# Patient Record
Sex: FEMALE | Race: WHITE
[De-identification: names, ages, dates, MRNs, and addresses within clinical notes are randomized per-mention and may not be internally consistent; named-entity substitution may affect disease eponyms.]

---

## 2018-05-16 ENCOUNTER — HOSPITAL ENCOUNTER (OUTPATIENT)
Dept: HOSPITAL 47 - RADMRIMAIN | Age: 38
End: 2018-05-16
Payer: COMMERCIAL

## 2018-05-16 DIAGNOSIS — M47.817: Primary | ICD-10-CM

## 2018-05-16 PROCEDURE — 72158 MRI LUMBAR SPINE W/O & W/DYE: CPT

## 2018-05-17 NOTE — MR
EXAMINATION TYPE: MR lumbar spine wo/w con

 

DATE OF EXAM: 5/16/2018

 

COMPARISON: NONE

 

HISTORY: LBP for 20 years going into both lower extremities per patient, surgery Dec 2017 per patient
. Intervertebral disc degeneration and back pain per order.

 

TECHNIQUE: 

Multiplanar, multisequence images of the lumbar spine is performed without and with IV contrast, util
izing 7 mL intravenous Gadavist 

 

FINDINGS: Sagittal images of the lumbar spine show vertebral body heights and alignment to appear sat
isfactory. There is disc desiccation with mild disc space narrowing L5-S1 level otherwise the interve
rtebral discs demonstrate normal heights and hydration. Small posterior disc herniation with annular 
tear L5-S1 level is noted sagittal image 9. The conus medullaris is normal in position and signal end
ing superior L1 level.  The bone marrow signal intensity is within normal limits. No suspicious enhan
cement is seen. No significant spurring is noted.

 

Axial images show the T12-L1, L1-L2, L2-L3, and L3-L4 levels all to appear within normal limits.

 

Axial images at the L4-L5 level show mild left greater than right facet degenerative changes. Spinal 
canal is preserved. Bilateral neural foramina are patent.

 

Axial images at L5-S1 level show broad-based right paracentral/foraminal disc protrusion minimally ef
facing anterolateral thecal sac. Bilateral neural foramina are patent. There is mild facet arthropath
y bilaterally present. No central nerve effacement is noted.

 

IMPRESSION: Some mild degenerative changes lower lumbar spine most prominent L5-S1 level. No suspicio
us finding is seen to account for patient's bilateral radiculopathy type symptoms however.

## 2018-07-13 ENCOUNTER — HOSPITAL ENCOUNTER (OUTPATIENT)
Dept: HOSPITAL 47 - RADCTMAIN | Age: 38
End: 2018-07-13
Payer: COMMERCIAL

## 2018-07-13 DIAGNOSIS — R91.1: Primary | ICD-10-CM

## 2018-07-13 PROCEDURE — 71260 CT THORAX DX C+: CPT

## 2018-07-13 NOTE — CT
EXAMINATION TYPE: CT chest w con

 

DATE OF EXAM: 7/13/2018

 

COMPARISON: None.

 

HISTORY: Solitary pulmonary nodule.

 

CT DLP: 555 mGycm

Automated exposure control for dose reduction was used.

 

CONTRAST: 

CT scan of the chest is performed with IV Contrast, patient injected with 100 mL of Isovue M300.

 

FINDINGS: The lungs are clear. No pulmonary nodules are seen.

 

There is no significant axillary, internal mammary, mediastinal or hilar adenopathy. There is no pleu
ral or pericardial fluid. The heart is not enlarged.

 

Visualized upper abdominal structures are unremarkable.

 

No osseous lesion is seen.

 

IMPRESSION:  

 

NORMAL CT SCAN OF THE CHEST.

## 2021-08-12 ENCOUNTER — HOSPITAL ENCOUNTER (INPATIENT)
Dept: HOSPITAL 47 - FBPOP | Age: 41
LOS: 1 days | Discharge: HOME | End: 2021-08-13
Attending: OBSTETRICS & GYNECOLOGY | Admitting: OBSTETRICS & GYNECOLOGY
Payer: COMMERCIAL

## 2021-08-12 DIAGNOSIS — O48.0: Primary | ICD-10-CM

## 2021-08-12 DIAGNOSIS — Z53.20: ICD-10-CM

## 2021-08-12 DIAGNOSIS — Z3A.40: ICD-10-CM

## 2021-08-12 DIAGNOSIS — Z88.8: ICD-10-CM

## 2021-08-13 VITALS — SYSTOLIC BLOOD PRESSURE: 125 MMHG | DIASTOLIC BLOOD PRESSURE: 83 MMHG | HEART RATE: 65 BPM

## 2021-08-13 VITALS — TEMPERATURE: 98.1 F

## 2021-08-13 VITALS — RESPIRATION RATE: 16 BRPM

## 2021-08-13 NOTE — P.PROBDLV
Vaginal Delivery Note





- .


Vaginal Delivery Note: 





This is a 41-year-old white female  7 para 6006 [facility] 2021 at 40-5/7 

weeks' gestation.  Patient presented to our hospital with complaint of uterine 

contractions.  She has had intermittent, scant prenatal care with one physician 

visit and one ultrasound at another hospital.  Please see dictated history and 

physical for details.





On admission patient was 6 cm dilated.  Spontaneous amniorrhexis revealed dark 

meconium-stained fluid.  She became completely dilated at 0153 hours.  Patient 

refused any admission blood work, type and screen, or IV.  She did allow a Hep-

Lock to be placed in the left antecubital fossa.





Perineal body was prepped and draped in usual sterile fashion.  Fetal heart rate

was reassuring throughout the entire labor process.  Infant's head delivered 

occiput anterior and restituted accordingly.  There was no nuchal cord noted.  

The left or anterior shoulder was gently delivered from underneath the pubic 

symphysis at which time the oropharynx, nasopharynx, and external nares were all

suctioned carefully.  Patient was officially delivered a liveborn female infant 

at 0247 hours.  Umbilical cord was doubly clamped and ligated, she was handed to

waiting nurses for evaluation where Apgar scores of 9 and 9 at one and 5 minutes

respectively were given.  Placenta delivered spontaneously, it was inspected and

noted to be intact with trivascular cord at 0249 hours.  It was very darkly 

meconium stained, otherwise unremarkable.





At this time the perineal body was redraped.  Inspection of the cervix, vagina, 

perineum, periurethral, and perirectal areas revealed no lacerations or defects.

 Consistent uterine massage was performed, estimated blood loss 150 mL's.  

Infant weighed 7 lbs. 1 oz. or 3220 g.  Patient and her  are allowed to 

begin the bonding experience in the LDR.

## 2021-08-13 NOTE — P.HPOB
History of Present Illness


H&P Date: 21


Chief Complaint: strong uterine contractions





This is a 41-year-old female  7 para 6006 [facility] 2021 at 40-5/7 weeks'

gestation.  Patient presents with strong regular uterine contractions.  She 

denies vaginal bleeding or fluid leakage.  She has had no consistent prenatal 

care.





Past medical history is essentially negative.





Past surgical history laser surgery on the spine 2, eustachian tubes as a 

child.





Current medications prenatal vitamins.





ALLERGIES Soma to which reports hypotension.





Past obstetric history is significant for normal spontaneous vaginal deliveries 

6, all babies in the 7 pound range.





Social history patient is , she has never been a smoker.  She denies any 

alcohol or drug use.  She states that she and her  have very strong 

Latter-day abuse, they are SDA, Seventh Day Adv[employer]ists.  They are "Bible seeking 

and Bible believing" .  She would decline any blood products, any products 

whatsoever with pork or shellfish. 





On exam patient is 5 foot 8 inches, 155 pounds, blood pressure 117/74, pulse 77,

99% O2 saturation.  The general physical exam is within normal limits.  Chest is

clear in all fields.  Extremities reveal no edema.  She has normal reflexes.  

Cervix is 8 cm dilated, 80% effaced, -2 station, vertex presentation.  Fetal 

heart rate is consistent with reactive NST.  Patient is having spontaneous 

contractions every 2-5 minutes apart.





Impression: 40-5/7 week intrauterine pregnancy, active labor.  No prenatal care.

 Advanced maternal age.  Patient refusing IV, refusing any blood work, refusing 

any intervention.  All bleeding or signs at this time reassuring.





Plan: close maternal and fetal surveillance.  Patient is allowing Hep-Lock IV.  

I have called the pharmacy to check if Pitocin or Methergine contain any pork 

products.  Anticipating normal spontaneous vaginal delivery.





Review of Systems


Constitutional: Reports as per HPI

## 2021-08-14 NOTE — P.DS
Providers


Date of admission: 


21 00:41





Expected date of discharge: 21


Attending physician: 


Patrizia Marrero





Primary care physician: 


Stated None








- Discharge Diagnosis(es)


(1) Normal spontaneous vaginal delivery


Status: Acute   


Hospital Course: 





The patient is a 41-year-old  7 para 6006 presents to labor and delivery 

at 40-5/7 weeks by unclear dating parameters.  She presents in active labor with

all signs reassuring.  She has had no prenatal care during the pregnancy aside 

from a single visit to an obstetrician with ultrasound early in the pregnancy.  

On labor and delivery, she ultimately progressed to complete at which time she 

pushed to a normal spontaneous vaginal delivery of a viable 7 lbs. 1 oz. baby 

girl with Apgars of 9 at 1 minute and 9 at 5 minutes.  She requested discharge 

prior to 24 hours postdelivery and was deemed stable for discharge was therefore

discharged on day of delivery less than 24 hours after delivery.  She was given 

instructions to call for any significantly increased bleeding or foul-smelling 

lochia, significantly increased fever or abdominal pain, perineal complaints, 

breast complaints or anything also concerned her.  She understood her 

instructions and was requested to follow-up in 6 weeks' time routinely.  

Maternal blood type is reportedly Rh+ and rubella status is reportedly immune.


Procedures: 





#1.  Artificial rupture of membranes #2.  Normal spontaneous vaginal delivery


Patient Condition at Discharge: Stable





Plan - Discharge Summary


New Discharge Prescriptions: 


No Action


   No Known Home Medications 


Discharge Medication List





No Known Home Medications  21 [History]








Follow up Appointment(s)/Referral(s): 


Patrizia Marrero MD [STAFF PHYSICIAN] - 6 Weeks


Discharge Disposition: HOME SELF-CARE